# Patient Record
Sex: MALE | URBAN - METROPOLITAN AREA
[De-identification: names, ages, dates, MRNs, and addresses within clinical notes are randomized per-mention and may not be internally consistent; named-entity substitution may affect disease eponyms.]

---

## 2023-10-27 ENCOUNTER — APPOINTMENT (RX ONLY)
Dept: URBAN - METROPOLITAN AREA CLINIC 352 | Facility: CLINIC | Age: 43
Setting detail: DERMATOLOGY
End: 2023-10-27

## 2023-10-27 DIAGNOSIS — Z12.83 ENCOUNTER FOR SCREENING FOR MALIGNANT NEOPLASM OF SKIN: ICD-10-CM

## 2023-10-27 DIAGNOSIS — L81.4 OTHER MELANIN HYPERPIGMENTATION: ICD-10-CM

## 2023-10-27 DIAGNOSIS — L57.8 OTHER SKIN CHANGES DUE TO CHRONIC EXPOSURE TO NONIONIZING RADIATION: ICD-10-CM

## 2023-10-27 DIAGNOSIS — D22 MELANOCYTIC NEVI: ICD-10-CM

## 2023-10-27 DIAGNOSIS — L82.1 OTHER SEBORRHEIC KERATOSIS: ICD-10-CM

## 2023-10-27 DIAGNOSIS — Q83.3 ACCESSORY NIPPLE: ICD-10-CM

## 2023-10-27 DIAGNOSIS — D18.0 HEMANGIOMA: ICD-10-CM

## 2023-10-27 PROBLEM — D22.5 MELANOCYTIC NEVI OF TRUNK: Status: ACTIVE | Noted: 2023-10-27

## 2023-10-27 PROBLEM — D18.01 HEMANGIOMA OF SKIN AND SUBCUTANEOUS TISSUE: Status: ACTIVE | Noted: 2023-10-27

## 2023-10-27 PROCEDURE — ? COUNSELING

## 2023-10-27 PROCEDURE — ? TREATMENT REGIMEN

## 2023-10-27 PROCEDURE — 99203 OFFICE O/P NEW LOW 30 MIN: CPT

## 2023-10-27 PROCEDURE — ? FULL BODY SKIN EXAM

## 2023-10-27 ASSESSMENT — LOCATION DETAILED DESCRIPTION DERM
LOCATION DETAILED: LEFT SUPERIOR UPPER BACK
LOCATION DETAILED: XIPHOID
LOCATION DETAILED: LEFT MID-UPPER BACK
LOCATION DETAILED: RIGHT INFERIOR UPPER BACK
LOCATION DETAILED: SUPERIOR LUMBAR SPINE

## 2023-10-27 ASSESSMENT — LOCATION SIMPLE DESCRIPTION DERM
LOCATION SIMPLE: LOWER BACK
LOCATION SIMPLE: LEFT UPPER BACK
LOCATION SIMPLE: ABDOMEN
LOCATION SIMPLE: RIGHT UPPER BACK

## 2023-10-27 ASSESSMENT — LOCATION ZONE DERM: LOCATION ZONE: TRUNK

## 2024-06-24 ENCOUNTER — APPOINTMENT (RX ONLY)
Dept: URBAN - METROPOLITAN AREA CLINIC 352 | Facility: CLINIC | Age: 44
Setting detail: DERMATOLOGY
End: 2024-06-24

## 2024-06-24 DIAGNOSIS — D492 NEOPLASM OF UNSPECIFIED NATURE OF BONE, SOFT TISSUE, AND SKIN: ICD-10-CM

## 2024-06-24 PROBLEM — R22.9 LOCALIZED SWELLING, MASS AND LUMP, UNSPECIFIED: Status: ACTIVE | Noted: 2024-06-24

## 2024-06-24 PROCEDURE — 99212 OFFICE O/P EST SF 10 MIN: CPT

## 2024-06-24 PROCEDURE — ? OBSERVATION AND MEASURE

## 2024-06-24 PROCEDURE — ? ORDER ULTRASOUND

## 2024-06-24 ASSESSMENT — LOCATION ZONE DERM: LOCATION ZONE: FACE

## 2024-06-24 ASSESSMENT — LOCATION DETAILED DESCRIPTION DERM: LOCATION DETAILED: RIGHT LATERAL FOREHEAD

## 2024-06-24 ASSESSMENT — LOCATION SIMPLE DESCRIPTION DERM: LOCATION SIMPLE: RIGHT FOREHEAD

## 2024-06-24 NOTE — PROCEDURE: ORDER ULTRASOUND
Provider: SHEREE Mijares
Detail Level: Simple
Time Frame Unit: day(s)
Ultrasound Protocol: Ultrasound of Subcutaneous Mass
Subcutaneous Lesion Ultrasound Reason: evaluate of soft tissue mass, determine if it is above or below the muscle, rule out vascular malformation
Priority: normal

## 2024-08-14 ENCOUNTER — APPOINTMENT (RX ONLY)
Dept: URBAN - METROPOLITAN AREA CLINIC 352 | Facility: CLINIC | Age: 44
Setting detail: DERMATOLOGY
End: 2024-08-14

## 2024-08-14 DIAGNOSIS — D21 OTHER BENIGN NEOPLASMS OF CONNECTIVE AND OTHER SOFT TISSUE: ICD-10-CM

## 2024-08-14 PROBLEM — D21.9 BENIGN NEOPLASM OF CONNECTIVE AND OTHER SOFT TISSUE, UNSPECIFIED: Status: ACTIVE | Noted: 2024-08-14

## 2024-08-14 PROCEDURE — 99203 OFFICE O/P NEW LOW 30 MIN: CPT

## 2024-08-14 PROCEDURE — ? OUTSIDE RADIOLOGY REPORTS REVIEWED

## 2024-08-14 PROCEDURE — ? SURGICAL DECISION MAKING

## 2024-08-14 PROCEDURE — ? COUNSELING - SUBCUTANEOUS TUMOR

## 2024-08-14 ASSESSMENT — LOCATION SIMPLE DESCRIPTION DERM: LOCATION SIMPLE: RIGHT FOREHEAD

## 2024-08-14 ASSESSMENT — LOCATION DETAILED DESCRIPTION DERM: LOCATION DETAILED: RIGHT FOREHEAD

## 2024-08-14 ASSESSMENT — LOCATION ZONE DERM: LOCATION ZONE: FACE

## 2024-08-14 NOTE — PROCEDURE: SURGICAL DECISION MAKING
Discussion: Area feels soft and mobile, pulsations are appreciated under the mass. Recommend doing the procedure in the operating room, as there are vessels nearby, due to better lighting and more supplies. Patient advised to get another CD, as the one provided does not include images only the report. Discussed healing process, which could take up to four weeks, as well as restrictions during that time. Patient is interested in having the lesion removed in the OR, and is looking to have it scheduled for November. Discussed risk of Stroke, seizure, and other adverse events related to general anesthesia. History of questionable neurological event occurred recently where the right lip started drooping downwards for 4-6 hours, but then returned to normal. Imaging taken at [facility], which showed no evidence of stroke or other neurological event. Patient to get medical clearance from the primary care doctor prior to OR.
Identified Risk Factors Documented?: yes
Complexity (Necessary For Coding; Major - 90 Day Global With Some Exceptions; Minor - 10 Day Global): minor
Risk Assessment Explanation (Does Not Render In The Note): Clinical determination of the probability and/or consequences of an event, such as surgery. Clinical assessment of the level of risk is affected by the nature of the event under consideration for the patient. Modifier 57 is used to indicate an Evaluation and Management (E/M) service resulted in the initial decision to perform surgery either the day before a major surgery (90 day global) or the day of a major surgery.

## 2024-08-14 NOTE — PROCEDURE: OUTSIDE RADIOLOGY REPORTS REVIEWED
Detail Level: Zone
Number Of Unique Tests Reviewed: 1
Us Reviewed: FINDINGS:\\n\\Winter the subcutaneous tissues at the right forehead/temple there is a 1.2 x 1.1 x 0.2 cm ovoid, heterogeneously hyperechoic, circumscribed/apparently encapsulated lesion. No internal blood flow is seen. Possible adjacent vessel.

## 2024-11-12 ENCOUNTER — APPOINTMENT (RX ONLY)
Dept: URBAN - METROPOLITAN AREA CLINIC 352 | Facility: CLINIC | Age: 44
Setting detail: DERMATOLOGY
End: 2024-11-12

## 2024-11-12 DIAGNOSIS — Z08 ENCOUNTER FOR FOLLOW-UP EXAMINATION AFTER COMPLETED TREATMENT FOR MALIGNANT NEOPLASM: ICD-10-CM

## 2024-11-12 PROCEDURE — ? TREATMENT REGIMEN

## 2024-11-12 PROCEDURE — 99213 OFFICE O/P EST LOW 20 MIN: CPT

## 2024-11-12 PROCEDURE — ? POST-OP WOUND CHECK (NO GLOBAL PERIOD)

## 2024-11-12 ASSESSMENT — LOCATION SIMPLE DESCRIPTION DERM: LOCATION SIMPLE: RIGHT FOREHEAD

## 2024-11-12 ASSESSMENT — LOCATION DETAILED DESCRIPTION DERM: LOCATION DETAILED: RIGHT FOREHEAD

## 2024-11-12 ASSESSMENT — LOCATION ZONE DERM: LOCATION ZONE: FACE

## 2024-11-12 NOTE — PROCEDURE: POST-OP WOUND CHECK (NO GLOBAL PERIOD)
Wound Evaluated By: Charlee/
Additional Comments: Linear Complex
Quality 357: Surgical Site Infection (Ssi): No surgical site infection
Detail Level: Detailed